# Patient Record
Sex: FEMALE | Race: WHITE | Employment: OTHER | URBAN - METROPOLITAN AREA
[De-identification: names, ages, dates, MRNs, and addresses within clinical notes are randomized per-mention and may not be internally consistent; named-entity substitution may affect disease eponyms.]

---

## 2017-02-21 DIAGNOSIS — M25.561 RIGHT KNEE PAIN, UNSPECIFIED CHRONICITY: Primary | ICD-10-CM

## 2017-02-21 PROCEDURE — 73560 X-RAY EXAM OF KNEE 1 OR 2: CPT | Performed by: ORTHOPAEDIC SURGERY

## 2021-02-11 ENCOUNTER — OFFICE VISIT (OUTPATIENT)
Dept: ORTHOPEDIC SURGERY | Age: 56
End: 2021-02-11
Payer: MEDICARE

## 2021-02-11 VITALS — WEIGHT: 249 LBS | BODY MASS INDEX: 42.51 KG/M2 | HEIGHT: 64 IN

## 2021-02-11 DIAGNOSIS — M19.012 OSTEOARTHRITIS OF GLENOHUMERAL JOINT, LEFT: ICD-10-CM

## 2021-02-11 DIAGNOSIS — M75.112 INCOMPLETE TEAR OF LEFT ROTATOR CUFF, UNSPECIFIED WHETHER TRAUMATIC: ICD-10-CM

## 2021-02-11 DIAGNOSIS — M25.512 LEFT SHOULDER PAIN, UNSPECIFIED CHRONICITY: Primary | ICD-10-CM

## 2021-02-11 PROBLEM — E78.00 HYPERCHOLESTEREMIA: Status: ACTIVE | Noted: 2020-01-23

## 2021-02-11 PROBLEM — Z98.84 S/P LAPAROSCOPIC SLEEVE GASTRECTOMY: Status: ACTIVE | Noted: 2020-01-23

## 2021-02-11 PROBLEM — E66.01 MORBID OBESITY WITH BMI OF 40.0-44.9, ADULT (HCC): Status: ACTIVE | Noted: 2019-07-31

## 2021-02-11 PROBLEM — E55.9 VITAMIN D DEFICIENCY: Status: ACTIVE | Noted: 2018-03-28

## 2021-02-11 PROCEDURE — G8427 DOCREV CUR MEDS BY ELIG CLIN: HCPCS | Performed by: ORTHOPAEDIC SURGERY

## 2021-02-11 PROCEDURE — 1036F TOBACCO NON-USER: CPT | Performed by: ORTHOPAEDIC SURGERY

## 2021-02-11 PROCEDURE — G8417 CALC BMI ABV UP PARAM F/U: HCPCS | Performed by: ORTHOPAEDIC SURGERY

## 2021-02-11 PROCEDURE — L3670 SO ACRO/CLAV CAN WEB PRE OTS: HCPCS | Performed by: ORTHOPAEDIC SURGERY

## 2021-02-11 PROCEDURE — G8484 FLU IMMUNIZE NO ADMIN: HCPCS | Performed by: ORTHOPAEDIC SURGERY

## 2021-02-11 PROCEDURE — 3017F COLORECTAL CA SCREEN DOC REV: CPT | Performed by: ORTHOPAEDIC SURGERY

## 2021-02-11 PROCEDURE — 99203 OFFICE O/P NEW LOW 30 MIN: CPT | Performed by: ORTHOPAEDIC SURGERY

## 2021-02-11 RX ORDER — OXYCODONE AND ACETAMINOPHEN 10; 325 MG/1; MG/1
TABLET ORAL
COMMUNITY
Start: 2021-01-20

## 2021-02-11 RX ORDER — PANTOPRAZOLE SODIUM 40 MG/1
TABLET, DELAYED RELEASE ORAL
COMMUNITY
Start: 2020-04-16

## 2021-02-11 RX ORDER — MELOXICAM 15 MG/1
TABLET ORAL
COMMUNITY
Start: 2021-02-01

## 2021-02-11 NOTE — PROGRESS NOTES
[facility]  History and Physical  Shoulder Pain    Date:  2021    Name:  Gennaro Gonzáles  Address:  [address] [place] [address] [place] [address]    :  1965      Age:   54 y.o.    SSN:  [id number]      Medical Record Number:  <[id number]>    Reason for Visit:    Shoulder Pain (NP LEFT SHOULDER)      HPI:   Gennaro Gonzáles is a 54 y.o. female who presents to our office today complaining of  left shoulder pain. Patient has a 2-year history of left shoulder pain. Since then she reports her pain level has progressed and her function has continued to become worse. Noticed a huge loss in her range of motion over the last 2 years. She did see Dr. Lidia Chiu at [facility] a couple years ago and had received a corticosteroid injection. She had very little relief from that. The patient reports that her left shoulder does interrupt her activities of daily living. Also disrupts her sleep at nighttime. She denies any specific injuries to her shoulders. Currently sees Dr. Fidelia Vaughn for pain management. He primarily takes care of her lumbar and cervical spine. She is on chronic pain medications with Percocet 10 mg. She is currently on disability for her lower back and for her history of chronic lymphocytic leukemia. Pain Assessment  Location of Pain: Shoulder  Location Modifiers: Left  Severity of Pain: 2  Quality of Pain: Aching, Sharp  Duration of Pain: Persistent  Frequency of Pain: Constant  Aggravating Factors: Other (Comment)(GEN USE)  Limiting Behavior: Yes  Relieving Factors: Nsaids, Rest, Heat  Result of Injury: No  Work-Related Injury: No  Are there other pain locations you wish to document?: No    Review of Systems:  A 14 point review of systems available in the scanned medical record as documented by the patient. The review is negative with the exception of those things mentioned in the History of Present Illness and Past Medical History. Past History:  Past Medical History:   Diagnosis Date    Arthritis     Asthma     [facility] [address]     Hypertension     Hypogammaglobulinemia ([facility] [address]     Osteoarthritis      Past Surgical History:   Procedure Laterality Date    BACK SURGERY      lumbar fusion     SECTION      X2    GALLBLADDER SURGERY      HERNIA REPAIR      SLEEVE GASTRECTOMY       Current Outpatient Medications on File Prior to Visit   Medication Sig Dispense Refill    VENTOLIN  (90 Base) MCG/ACT inhaler       cyanocobalamin 500 MCG tablet Take 500 mcg by mouth daily      diphenhydrAMINE (SOMINEX) 25 MG tablet Take 50 mg by mouth nightly as needed      meloxicam (MOBIC) 15 MG tablet       oxyCODONE-acetaminophen (PERCOCET)  MG per tablet       pantoprazole (PROTONIX) 40 MG tablet TAKE ONE TABLET BY MOUTH DAILY      cyclobenzaprine (FLEXERIL) 5 MG tablet Take 5 mg by mouth 3 times daily as needed for Muscle spasms      melatonin 3 MG TABS tablet Take 3 mg by mouth daily       No current facility-administered medications on file prior to visit.       Social History     Socioeconomic History    Marital status: Single     Spouse name: Not on file    Number of children: Not on file    Years of education: Not on file    Highest education level: Not on file   Occupational History    Not on file   Social Needs    Financial resource strain: Not on file    Food insecurity     Worry: Not on file     Inability: Not on file    Transportation needs     Medical: Not on file     Non-medical: Not on file   Tobacco Use    Smoking status: Former Smoker     Types: Cigarettes     Quit date: 2012     Years since quittin.2    Smokeless tobacco: Never Used   Substance and Sexual Activity    Alcohol use: No    Drug use: No    Sexual activity: Not on file   Lifestyle    Physical activity     Days per week: Not on file     Minutes per session: Not on file    Stress: Not on file Relationships    Social connections     Talks on phone: Not on file     Gets together: Not on file     Attends Mandaeism service: Not on file     Active member of club or organization: Not on file     Attends meetings of clubs or organizations: Not on file     Relationship status: Not on file    Intimate partner violence     Fear of current or ex partner: Not on file     Emotionally abused: Not on file     Physically abused: Not on file     Forced sexual activity: Not on file   Other Topics Concern    Not on file   Social History Narrative    Not on file     History reviewed. No pertinent family history. Current Medications:    Current Outpatient Medications   Medication Sig Dispense Refill    VENTOLIN  (90 Base) MCG/ACT inhaler       cyanocobalamin 500 MCG tablet Take 500 mcg by mouth daily      diphenhydrAMINE (SOMINEX) 25 MG tablet Take 50 mg by mouth nightly as needed      meloxicam (MOBIC) 15 MG tablet       oxyCODONE-acetaminophen (PERCOCET)  MG per tablet       pantoprazole (PROTONIX) 40 MG tablet TAKE ONE TABLET BY MOUTH DAILY      cyclobenzaprine (FLEXERIL) 5 MG tablet Take 5 mg by mouth 3 times daily as needed for Muscle spasms      melatonin 3 MG TABS tablet Take 3 mg by mouth daily       No current facility-administered medications for this visit. Allergies: Allergies   Allergen Reactions    Morphine Other (See Comments)     HYPOTENSION      Sulfa Antibiotics Rash       Physical Exam:  There were no vitals filed for this visit. General: Benny Whitaker is a healthy and well appearing 54 y.o. female who is sitting comfortably in our office in acute distress. General Exam:   Constitutional: Patient is adequately groomed with no evidence of malnutrition  DTRs: Deep tendon reflexes are intact  Mental Status: The patient is oriented to time, place and person. The patient's mood and affect are appropriate. Lymphatic: The lymphatic examination bilaterally reveals all areas to be without enlargement or induration. Vascular: Examination reveals no swelling or calf tenderness. Peripheral pulses are palpable and 2+. Neurological: The patient has good coordination. There is no weakness or sensory deficit. Neuro: alert. Oriented X 3  Eyes: Extra-ocular muscles intact  Mouth: Oral mucosa moist. No perioral lesions  Pulm: Respirations unlabored and regular. left Shoulder Exam:  Inspection: No gross deformities, no signs of infection. Palpation: She has glenohumeral crepitus present. She has joint line tenderness. She also has tenderness over the rotator cuff footprint and bicipital groove. No tenderness at the [facility] joint. Active Range of Motion: Forward elevation of 85, abduction of 80, external rotation with elbow at the side 20 versus 45 on the right, internal rotation to the back is back pocket versus T8    Passive Range of Motion: Passively forward elevation can be further increased to 90 and abduction the 90. Strength:  5/5 internal rotation with resistance, +4/5 external rotation with resistance, 4/5 champagne toast test.    Special Tests:   No Tommie muscle deformity. Neurovascular: Sensation to light touch is intact, no motor deficits, palpable radial pulses 2+  Comparison right Shoulder Examination:    Inspection:   No gross deformities, no signs of infection. Palpation: No glenohumeral crepitus present. She has no tenderness about the shoulder. Active Range of Motion: Forward elevation of 160, abduction of 160, external rotation with elbow at the side 48, internal rotation to the back is T8    Passive Range of Motion: Similar to active. Strength: 5/5 external/internal rotation with resistance, 5/5 champagne toast test,    Special Tests:   No Tommie muscle deformity.     Neurovascular: Sensation to light touch is intact, no motor deficits, palpable radial pulses 2+    Laboratory: No visits with results within 14 Day(s) from this visit. Latest known visit with results is:   No results found for any previous visit. No results found for this or any previous visit (from the past 24 hour(s)). Radiographic:  3 xray views of the left  shoulder including True AP in internal and external and axillary lateral were taken in our office today reveals advanced narrowing and degenerative changes in the glenohumeral space. There are small bone spurs on the inferior humeral head and the inferior glenoid. no fractures, dislocations, visible tumors, or signs of acute trauma. MRI SHOULDER LEFT WO CONTRAST  11/11/2020 3:16 PM    FINDINGS:     ROTATOR CUFF: Low grade partial-thickness articular sided tearing of the  posterior supraspinatus and anterior infraspinatus tendons measuring  approximately 15 mm in anteroposterior dimension, 9 mm in transverse dimension,  and involving less than 50% of the tendon thickness. No high-grade  partial/full-thickness retracted tendon tears. Teres minor and subscapularis  tendons are intact. No significant muscle atrophy. LONG HEAD BICEPS TENDON: Intact and normally positioned. GLENOHUMERAL JOINT: No acute fracture or concerning marrow abnormality. Advanced  glenohumeral joint osteoarthritis with diffuse full-thickness cartilage loss,  bony remodeling and flattening of the superior medial humeral head, subchondral  cystic changes, marginal osteophytosis, diffuse labral tearing, and a reactive  joint effusion. ACROMIOCLAVICULAR JOINT: Severe AC joint osteoarthritis. OTHER: Mild fluid distention of the subacromial/subdeltoid bursa. IMPRESSION:    1. Advanced glenohumeral joint osteoarthritis with diffuse full-thickness  cartilage loss and labral tearing. 2. Low-grade partial-thickness articular sided tear of the superior rotator  cuff measuring 15 x 9 mm. No high-grade partial/full-thickness retracted tendon  tears. 3.  AC joint osteoarthritis and subacromial/subdeltoid bursitis. Self assessment questionnaires including ASES and Simple Shoulder Test were completed today. Assessment:  Joslyn Scanlon is a 54 y.o. female with left shoulder pain related to bone-on-bone advancing glenohumeral osteoarthritis. Impression:  Encounter Diagnoses   Name Primary?  Left shoulder pain, unspecified chronicity Yes    Osteoarthritis of glenohumeral joint, left     Incomplete tear of left rotator cuff, unspecified whether traumatic        Office Procedures:  Orders Placed This Encounter   Procedures    XR SHOULDER LEFT (MIN 2 VIEWS)     Standing Status:   Future     Number of Occurrences:   1     Standing Expiration Date:   2/11/2022     Order Specific Question:   Reason for exam:     Answer:   PAIN       Plan:   Pertinent imaging was reviewed. The etiology, natural history, and treatment options for the disorder were discussed. The roles of activity modifications, medications, cryotherapy and heat, injections, physical therapy, and surgical interventions were all described to the patient and questions were answered. Given that she is so young and as such poor function of her left shoulder directly related to her primary osteoarthritis, we are recommending that she have a left anatomic total shoulder arthroplasty. We did discuss the risks and benefits of the procedure. Risks, benefits and potential complications of total shoulder arthroplasty surgery were discussed with the patient. Risks discussed include but are not limited to bleeding, infection, anesthetic risk, injury to nerves and blood vessels, deep vein thrombosis, residual stiffness and weakness, and the need for revision surgery. The patient also understands that anesthetic risks include cardiopulmonary issues, drug reactions and even death. The patient voices an understanding of the importance of physical therapy and home exercises after surgery. All questions were answered and written informed consent for surgery was obtained today. The patient was counseled at length about the risks of larisa Covid-19 during their perioperative period and any recovery window from their procedure. The patient was made aware that larisa Covid-19  may worsen their prognosis for recovering from their procedure  and lend to a higher morbidity and/or mortality risk. All material risks, benefits, and reasonable alternatives including postponing the procedure were discussed. The patient does wish to proceed with the procedure at this time. We proceeded to fit her with an UltraSling brace today. She was agreeable to the above plan. 2/11/2021  1:43 PM      Cyndi Yeung PA-C  Orthopaedic Sports Medicine Physician Assistant    During this examination, Christian Graham PA-C, functioned as a scribe for Dr. Mandi Elam. This dictation was performed with a verbal recognition program (DRAGON) and it was checked for errors. It is possible that there are still dictated errors within this office note. If so, please bring any errors to my attention for an addendum.   All efforts were made to ensure that this office note is accurate.  _________________ I, Dr. Tiffanie Sheth, personally performed the services described in this documentation as described by Marco Del Cid PA-C in my presence, and it is both accurate and complete. Velia Bauer MD, PhD  2/11/2021

## 2021-02-26 ENCOUNTER — TELEPHONE (OUTPATIENT)
Dept: ORTHOPEDIC SURGERY | Age: 56
End: 2021-02-26

## 2021-02-26 NOTE — TELEPHONE ENCOUNTER
Auth: NPR  Date: 3/15/2021  Reference # None  Spoke with: None  Type of SX: Outpatient  Location:  [facility]  CPT 23750   SX area: Lt shoulder  Insurance: Medicare A&B

## 2021-03-08 ENCOUNTER — TELEPHONE (OUTPATIENT)
Dept: ORTHOPEDIC SURGERY | Age: 56
End: 2021-03-08

## 2021-03-08 NOTE — TELEPHONE ENCOUNTER
Surgery and/or Procedure Scheduling     Contact Name: Alek Woods Request: Needs to cx her sx on 3/15 due to COVID symptoms  Patient Contact Number: [phone]

## 2021-03-25 ENCOUNTER — TELEPHONE (OUTPATIENT)
Dept: ORTHOPEDIC SURGERY | Age: 56
End: 2021-03-25

## 2021-03-30 ENCOUNTER — TELEPHONE (OUTPATIENT)
Dept: ORTHOPEDIC SURGERY | Age: 56
End: 2021-03-30

## 2021-03-30 NOTE — TELEPHONE ENCOUNTER
Appointment Request     Patient requesting earlier appointment: No  Appointment offered to patient: post op  Patient Contact Number: [phone]

## 2021-04-08 ENCOUNTER — OFFICE VISIT (OUTPATIENT)
Dept: ORTHOPEDIC SURGERY | Age: 56
End: 2021-04-08

## 2021-04-08 VITALS — BODY MASS INDEX: 42.51 KG/M2 | HEIGHT: 64 IN | WEIGHT: 249 LBS

## 2021-04-08 DIAGNOSIS — M75.112 INCOMPLETE TEAR OF LEFT ROTATOR CUFF, UNSPECIFIED WHETHER TRAUMATIC: ICD-10-CM

## 2021-04-08 DIAGNOSIS — G89.29 CHRONIC LEFT SHOULDER PAIN: Primary | ICD-10-CM

## 2021-04-08 DIAGNOSIS — Z96.612 STATUS POST TOTAL SHOULDER REPLACEMENT, LEFT: ICD-10-CM

## 2021-04-08 DIAGNOSIS — M25.512 LEFT SHOULDER PAIN, UNSPECIFIED CHRONICITY: ICD-10-CM

## 2021-04-08 DIAGNOSIS — M19.012 OSTEOARTHRITIS OF GLENOHUMERAL JOINT, LEFT: ICD-10-CM

## 2021-04-08 DIAGNOSIS — M25.512 CHRONIC LEFT SHOULDER PAIN: Primary | ICD-10-CM

## 2021-04-08 PROCEDURE — 99024 POSTOP FOLLOW-UP VISIT: CPT | Performed by: ORTHOPAEDIC SURGERY

## 2021-04-08 RX ORDER — TRIAMCINOLONE ACETONIDE 0.1 %
PASTE (GRAM) DENTAL 3 TIMES DAILY
COMMUNITY
Start: 2021-04-06

## 2021-04-08 RX ORDER — AMOXICILLIN 250 MG
1-4 CAPSULE ORAL NIGHTLY
COMMUNITY
Start: 2021-03-29

## 2021-04-08 RX ORDER — LIDOCAINE HYDROCHLORIDE 20 MG/ML
5 SOLUTION OROPHARYNGEAL 3 TIMES DAILY PRN
COMMUNITY
Start: 2021-04-06 | End: 2021-04-13

## 2021-04-08 RX ORDER — ONDANSETRON 4 MG/1
TABLET, ORALLY DISINTEGRATING ORAL
COMMUNITY
Start: 2021-03-26

## 2021-04-08 NOTE — PROGRESS NOTES
[facility]  Office Visit  Follow up  Date:  2021    Name:  Anny Ceja  Address:  [address] [address]    :  1965      Age:   54 y.o.    SSN:  [id number]      Medical Record Number:  <[id number]>    Chief Complaint:    Status post left anatomic total shoulder arthroplasty on 3/29/2021    HPI:   Anny Ceja is a 54 y.o. female who is 1 week status post a left anatomic total shoulder arthroplasty. Patient is doing well. Pain is relatively well controlled. She does take chronic narcotics for other reasons. Denies any numbness or tingling. Denies any setbacks. She has been compliant with her sling use. She denies any new numbness, tingling, fevers, chills, chest pain, shortness of breath, or any other new significant symptoms. Pain Assessment  Location of Pain: Shoulder  Location Modifiers: Left  Severity of Pain: 8  Quality of Pain: Aching  Duration of Pain: Persistent  Frequency of Pain: Constant  Aggravating Factors: Other (Comment)  Limiting Behavior: Yes  Relieving Factors: Rest, Ice  Result of Injury: No  Work-Related Injury: No  Are there other pain locations you wish to document?: No    Past History:  Past Medical History:   Diagnosis Date    Arthritis     Asthma     [facility]ocytic [facility]) [facility])     Hypertension     [facility]obulinemia [facility] [address]     Osteoarthritis        Past Surgical History:   Procedure Laterality Date    BACK SURGERY      lumbar fusion     SECTION      X2    GALLBLADDER SURGERY      HERNIA REPAIR      SLEEVE GASTRECTOMY         Social History     Tobacco Use    Smoking status: Former Smoker     Types: Cigarettes     Quit date: 2012     Years since quittin.4    Smokeless tobacco: Never Used   Substance Use Topics    Alcohol use: No    Drug use: No        Family History:  family history is not on file.          Current Outpatient Medications:     lidocaine viscous hcl (XYLOCAINE) 2 % SOLN solution, Take 5 mLs by mouth 3 times daily as needed, Disp: , Rfl:     ondansetron (ZOFRAN-ODT) 4 MG disintegrating tablet, , Disp: , Rfl:     senna-docusate (PERICOLACE) 8.6-50 MG per tablet, Take 1-4 tablets by mouth nightly, Disp: , Rfl:     triamcinolone acetonide (KENALOG) 0.1 % paste, Take by mouth 3 times daily, Disp: , Rfl:     VENTOLIN  (90 Base) MCG/ACT inhaler, , Disp: , Rfl:     cyanocobalamin 500 MCG tablet, Take 500 mcg by mouth daily, Disp: , Rfl:     diphenhydrAMINE (SOMINEX) 25 MG tablet, Take 50 mg by mouth nightly as needed, Disp: , Rfl:     meloxicam (MOBIC) 15 MG tablet, , Disp: , Rfl:     oxyCODONE-acetaminophen (PERCOCET)  MG per tablet, , Disp: , Rfl:     pantoprazole (PROTONIX) 40 MG tablet, TAKE ONE TABLET BY MOUTH DAILY, Disp: , Rfl:     cyclobenzaprine (FLEXERIL) 5 MG tablet, Take 5 mg by mouth 3 times daily as needed for Muscle spasms, Disp: , Rfl:     melatonin 3 MG TABS tablet, Take 3 mg by mouth daily, Disp: , Rfl:       Allergies   Allergen Reactions    Morphine Other (See Comments)     HYPOTENSION      Sulfa Antibiotics Rash         Review of Systems: A 14 point review of systems available in the scanned medical record as documented by the patient. Today's review pertinent items are noted in HPI. Patient has had no medical changes since last evaluated        Physical Exam:  Ht 5' 3.5\" (1.613 m)   Wt 249 lb (112.9 kg)   BMI 43.42 kg/m²       General: No acute distress, well nourished  CV: No obvious peripheral edema.  Normal peripheral pulses  Neuro: Alert & oriented x 3  Psych: Normal mood and affect    Left shoulder exam    Incision well approximated, no redness or drainage noted  Mild swelling and ecchymosis noted in the left upper extremity  AIN/PIN/ulnar nerve motor intact  Sensation intact light touch distally  Palpable radial pulse, fingers warm well perfused  Gentle circumduction of the shoulder without pain or crepitus      Radiographic:  X-rays obtained and reviewed in office, reviewed and interpreted by me today:  Views: 2 views  Location: Left shoulder  Impression: Anatomic total shoulder arthroplasty with good alignment, no evidence of new fracture dislocation compared with postoperative imaging. Assessment:  Peggy Craig is a 54 y.o. female with:  1. Status post left anatomic total shoulder arthroplasty on 3/29/2021    Impression:  Encounter Diagnoses   Name Primary?  Chronic left shoulder pain Yes    Osteoarthritis of glenohumeral joint, left     Incomplete tear of left rotator cuff, unspecified whether traumatic     Left shoulder pain, unspecified chronicity        Office Procedures:  Orders Placed This Encounter   Procedures    XR SHOULDER LEFT (MIN 2 VIEWS)     Standing Status:   Future     Number of Occurrences:   1     Standing Expiration Date:   4/8/2022    External Referral To Physical Therapy     Referral Priority:   Routine     Referral Type:   Eval and Treat     Referral Reason:   Specialty Services Required     Requested Specialty:   Physical Therapy     Number of Visits Requested:   1       Plan:   Overall, Peggy Craig is doing well although her pain levels are a bit higher than typical. She needs to remain in her sling. May remove for clothing and hygiene. We will have her start physical therapy working on gentle range of motion of the wrist elbow and shoulder. Updated referral and letter provided today. We would like to see the patient back in 2 weeks for repeat clinical exam.    [facility] Fellow    The encounter with Peggy Craig was supervised by Dr Hallie Billings who personally examined the patient and reviewed the plan. This dictation was performed with a verbal recognition program (DRAGON) and it was checked for errors. It is possible that there are still dictated errors within this office note. If so, please bring any errors to my attention for an addendum.   All efforts were made to ensure that this office note is accurate.   _____________  I was physically present and personally supervised the Orthopaedic Sports Medicine Fellow in the evaluation and development of a treatment plan for this patient. I personally interviewed the patient and performed a physical examination. In addition, I discussed the patient's condition and treatment options with them. I have also reviewed and agree with the past medical, family and social history unless otherwise noted. All of the patient's questions were answered. Velia Steinberg MD, PhD  4/8/2021

## 2021-04-12 ENCOUNTER — TELEPHONE (OUTPATIENT)
Dept: ORTHOPEDIC SURGERY | Age: 56
End: 2021-04-12

## 2021-04-13 ENCOUNTER — TELEPHONE (OUTPATIENT)
Dept: ORTHOPEDIC SURGERY | Age: 56
End: 2021-04-13

## 2021-05-06 ENCOUNTER — TELEPHONE (OUTPATIENT)
Dept: ORTHOPEDIC SURGERY | Age: 56
End: 2021-05-06

## 2021-05-06 ENCOUNTER — OFFICE VISIT (OUTPATIENT)
Dept: ORTHOPEDIC SURGERY | Age: 56
End: 2021-05-06

## 2021-05-06 VITALS — WEIGHT: 249 LBS | BODY MASS INDEX: 42.51 KG/M2 | HEIGHT: 64 IN

## 2021-05-06 DIAGNOSIS — M25.512 CHRONIC LEFT SHOULDER PAIN: ICD-10-CM

## 2021-05-06 DIAGNOSIS — G89.29 CHRONIC LEFT SHOULDER PAIN: ICD-10-CM

## 2021-05-06 DIAGNOSIS — Z96.612 STATUS POST TOTAL SHOULDER REPLACEMENT, LEFT: Primary | ICD-10-CM

## 2021-05-06 PROCEDURE — 99024 POSTOP FOLLOW-UP VISIT: CPT | Performed by: ORTHOPAEDIC SURGERY

## 2021-05-06 NOTE — PROGRESS NOTES
Deferred    Strength: Deferred    Special Tests:  Slight [place], No Tommie muscle deformity. Neurovascular: Sensation to light touch is intact, no motor deficits, palpable radial pulses 2+      Radiology:     Plain radiographs of the left shoulder comprising 2 views: AP and Axillary lateral were obtained and reviewed in the office: Shows postsurgical changes from the total shoulder replacement. All the components are in good placement without any signs of loosening, fractures, subluxations or dislocations. Impression: Stable postop x-ray. Assessment :  Ms. Francisco J Clinton is a pleasant, 54 y.o. patient who is 5 weeks out following a left anatomic total shoulder replacement on 3/29/21. Impression:  Encounter Diagnoses   Name Primary?  Chronic left shoulder pain     Status post total shoulder replacement, left Yes       Office Procedures:  Orders Placed This Encounter   Procedures    XR SHOULDER LEFT (MIN 2 VIEWS)     Standing Status:   Future     Number of Occurrences:   1     Standing Expiration Date:   5/6/2022     Order Specific Question:   Reason for exam:     Answer:   POST OP    Amb External Referral To Physical Therapy     Referral Priority:   Routine     Referral Type:   Consult for Advice and Opinion     Referral Reason:   Patient Preference     Requested Specialty:   Physical Therapy     Number of Visits Requested:   1       Treatment Plan:  Francisco J Clinton is progressing on track. At 6 weeks postop she may discontinue use of the sling altogether. She may resume driving. We recommend She continue in physical therapy. A new physical therapy letter was documented in EPIC today. We would like for her to go now twice per week. We will see Venancioelizabeth Brice back in 4 weeks and/or as needed. All questions were answered to patient's satisfaction and She was encouraged to call with any further questions or concerns. Francisco J Clinton is in agreement with this plan.     5/6/2021  3:28 PM    [facility]. Maureenlaw Dowd    During this examination, I, Valentin Boyd, functioned as a scribe for Dr. Lashanda Muller. The history taking and physical examination were performed by Dr. Eliz Cintron. All counseling during the appointment was performed between the patient and Dr. Eliz Cintron. 5/6/21   ______________  I, Dr. Lashanda Muller, personally performed the services described in this documentation as described by Nav Andre ATC in my presence, and it is both accurate and complete. Samer VICENTE Cintron MD, PhD  5/6/2021

## 2021-05-28 ENCOUNTER — TELEPHONE (OUTPATIENT)
Dept: ORTHOPEDIC SURGERY | Age: 56
End: 2021-05-28

## 2021-06-03 ENCOUNTER — OFFICE VISIT (OUTPATIENT)
Dept: ORTHOPEDIC SURGERY | Age: 56
End: 2021-06-03

## 2021-06-03 VITALS — WEIGHT: 249 LBS | BODY MASS INDEX: 42.51 KG/M2 | HEIGHT: 64 IN

## 2021-06-03 DIAGNOSIS — Z96.612 STATUS POST TOTAL SHOULDER REPLACEMENT, LEFT: Primary | ICD-10-CM

## 2021-06-03 PROCEDURE — 99024 POSTOP FOLLOW-UP VISIT: CPT | Performed by: PHYSICIAN ASSISTANT

## 2021-06-03 RX ORDER — TIZANIDINE 4 MG/1
TABLET ORAL
COMMUNITY
Start: 2021-06-01

## 2021-06-03 NOTE — PROGRESS NOTES
[facility]  History and Physical  Shoulder Pain    Date:  6/3/2021    Name:  Enrique Young  Address:  [address] [place] [address]    :  1965      Age:   54 y.o.    SSN:  [id number]      Medical Record Number:  <[id number]>    Reason for Visit:    Shoulder Pain (F/U LEFT SHOULDER)      HPI:   Enrique Young is a 54 y.o. female who presents to our office today for follow up of the left shoulder pain. She is s/p a left anatomic total shoulder arthroplasty on 3/29/2021. She is currently doing rehab at [facility] in [place], [place]. The patient is currently going 1-2 times a week. She reports she is doing well and has no concerns. She does plan to have RFA doing in the cervical spine and would like to know if it is ok for her to have this procedure done. Pain Assessment  Location of Pain: Shoulder  Location Modifiers: Left  Quality of Pain: Dull  Duration of Pain: Persistent  Frequency of Pain: Intermittent  Aggravating Factors: Stretching  Limiting Behavior: Yes  Relieving Factors: Rest, Ice, Exercise, Nsaids  Result of Injury: No  Work-Related Injury: No  Are there other pain locations you wish to document?: No    Patient has had no medical changes since last evaluated        Review of Systems:  A 14 point review of systems available in the scanned medical record as documented by the patient. The review is negative with the exception of those things mentioned in the History of Present Illness and Past Medical History. Past Medical History:  Patient's medications, allergies, past medical, surgical, social and family histories were reviewed and updated as appropriate. Allergies: Allergies   Allergen Reactions    Morphine Other (See Comments)     HYPOTENSION      Sulfa Antibiotics Rash       Physical Exam:  There were no vitals filed for this visit.   General: Enrique Young is a healthy and well appearing 54 y.o. female who is sitting

## 2021-06-03 NOTE — LETTER
Physical Therapy Rehabilitation Referral    Patient Name:  Kaylee Menjivar      YOB: 1965    Diagnosis:    1. Status post total shoulder replacement, left        Precautions:     [x] Evaluate and Treat    Post Op Instructions:  [] Continuous passive motion (CPM) [x] Elbow ROM  [x] Exercise in plane of scapula  [x]  Strengthening     [x] Pulley and instruction   [x] Home exercise program (copy to patient)   [] Sling when arm at risk  [] Sling or brace at all times   [x] AAROM: Forward elevation to  140+            [x] AAROM: External rotation  To  40+    [] Isometric external rotator strengthening [x] AAROM: internal rotation: up the back  [x] Isometric abductor strengthening  [x] AAROM: Internal abduction   [] Isometric internal rotator strengthening [x] AAROM: cross-body adduction             Stretching:     Strengthening:  [x] Four quadrant (FE, ER, IR, CBA)  [] Rotator cuff (ER, IR, Abd)  [x] Forward Elevation    [] External Rotators     [x] External Rotation    [] Internal Rotators  [x] Internal Rotation: up/back   [] Abductors     [x] Internal Rotation: supine in abduction  [x] Sleeper Stretch    [] Flexors  [x] Cross-body abduction    [] Extensors  [x] Pendulum (FE, Abd/Add, cw/ccw)  [x] Scapular Stabilizers   [x] Wall-walking (FE, Abd)        [x] Shoulder shrugs     [x] Table slides (FE)                [x] Rhomboid pinch  [] Elbow (flex, ext, pron, sup)        [] Lat.  Pull downs     [] Medial epicondylitis program       [] Forward punch   [] Lateral epicondylitis program       [] Internal rotators     [x] Progressive resistive exercises  [] Bench Press        [] Bench press plus  Activities:     [] Lateral pull-downs  [x] Rowing     [x] Progressive two-hand supine press  [] Stepper/Exercise bike   [x] Biceps: curls/supination  [] Swimming  [] Water exercises    Modalities:     Return to Sport:  [x] Of Choice      [] Plyometrics  [] Ultrasound     [] Rhythmic stabilization  [] Iontophoresis    [] Core strengthening   [] Moist heat     [] Sports specific program:   [] Massage         [x] Cryotherapy      [] Electrical stimulation     [] Paraffin  [] Whirlpool  [] TENS    [x] Home exercise program (copy to patient). Perform exercises for:   15     minutes    3      times/day  [x] Supervised physical therapy  Frequency: []  1x week  [x] 2x week  [] 3x week  [] Other:   Duration: [] 2 weeks   [] 4 weeks  [x] 6 weeks  [] Other:     Additional Instructions:     Velia Hutchinson MD, PhD